# Patient Record
Sex: MALE | Race: OTHER | HISPANIC OR LATINO
[De-identification: names, ages, dates, MRNs, and addresses within clinical notes are randomized per-mention and may not be internally consistent; named-entity substitution may affect disease eponyms.]

---

## 2017-05-31 ENCOUNTER — TRANSCRIPTION ENCOUNTER (OUTPATIENT)
Age: 37
End: 2017-05-31

## 2017-06-29 ENCOUNTER — EMERGENCY (EMERGENCY)
Facility: HOSPITAL | Age: 37
LOS: 0 days | Discharge: HOME | End: 2017-06-29
Admitting: INTERNAL MEDICINE

## 2017-06-29 DIAGNOSIS — S61.411A LACERATION WITHOUT FOREIGN BODY OF RIGHT HAND, INITIAL ENCOUNTER: ICD-10-CM
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
## 2017-06-29 DIAGNOSIS — Z23 ENCOUNTER FOR IMMUNIZATION: ICD-10-CM
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
## 2017-07-10 ENCOUNTER — TRANSCRIPTION ENCOUNTER (OUTPATIENT)
Age: 37
End: 2017-07-10

## 2018-06-28 ENCOUNTER — TRANSCRIPTION ENCOUNTER (OUTPATIENT)
Age: 38
End: 2018-06-28

## 2019-05-14 ENCOUNTER — TRANSCRIPTION ENCOUNTER (OUTPATIENT)
Age: 39
End: 2019-05-14

## 2021-02-15 ENCOUNTER — TRANSCRIPTION ENCOUNTER (OUTPATIENT)
Age: 41
End: 2021-02-15

## 2021-12-05 ENCOUNTER — TRANSCRIPTION ENCOUNTER (OUTPATIENT)
Age: 41
End: 2021-12-05

## 2023-01-19 ENCOUNTER — NON-APPOINTMENT (OUTPATIENT)
Age: 43
End: 2023-01-19

## 2024-03-12 ENCOUNTER — APPOINTMENT (OUTPATIENT)
Dept: PAIN MANAGEMENT | Facility: CLINIC | Age: 44
End: 2024-03-12
Payer: COMMERCIAL

## 2024-03-12 PROBLEM — Z00.00 ENCOUNTER FOR PREVENTIVE HEALTH EXAMINATION: Status: ACTIVE | Noted: 2024-03-12

## 2024-03-12 PROCEDURE — 99204 OFFICE O/P NEW MOD 45 MIN: CPT

## 2024-03-12 RX ORDER — METHOCARBAMOL 750 MG/1
750 TABLET, FILM COATED ORAL
Qty: 30 | Refills: 0 | Status: ACTIVE | COMMUNITY
Start: 2024-03-12 | End: 1900-01-01

## 2024-03-14 NOTE — PHYSICAL EXAM
[de-identified] :  Lumbar Spine Exam: Inspection:  erythema (-)  ecchymosis (-)  rashes (-)  alignment: no scoliosis    Palpation:  Midline lumbar tenderness:             (-)  paraspinal tenderness:                  L (+) ; R (+)  sciatic nerve tenderness :             L (+) ; R (+)  SI joint tenderness:                        L (-) ; R (-)  GTB tenderness:                            L (-);  R (-)    Limited ROM 2/2 to pain with lumbar extension   Strength: 5/5 throughout all muscle groups of the lower extremity                                                                            Right       Left     Hip Flexion:                (5/5)       (5/5)  Quadriceps:               (5/5)       (5/5)  Hamstrings:                (5/5)       (5/5)  Ankle Dorsiflexion:     (5/5)       (5/5)  EHL:                           (5/5)       (5/5)  Ankle Plantarflexion:  (3+/5)       (5/5)    Special Tests:  SLR:                           R (+) ; L (-)  Facet loading:            R (-) ; L (-)  CUBA test:               R (-) ; L (-)    Neurologic: decrease sensation over the L5 dermatome on the right.  Light touch intact throughout  LE  Reflexes normal and symmetric  Gait: non antalgic gait ambulates w/o assistive device

## 2024-03-14 NOTE — DISCUSSION/SUMMARY
[de-identified] : A discussion regarding available pain management treatment options occurred with the patient. These included interventional, rehabilitative, pharmacological, and alternative modalities. We will proceed with the following:   Imagin. MRI lumbar spine w/o contrast to evaluate for anatomic changes of the lumbar discs, nerves, and surrounding tissue that will help provide information to accurately diagnose the patient's cause of pain and therefore treat said pain generator in the most effective way possible - whether that be specific physical therapy recommendations, medications, and/or interventional therapies.   Medications: 1. Ordered Meloxicam 15 mg to be taken daily for two weeks straight and then as needed.  2. Ordered Gabapentin 300 mg to be taken TID.  3. Ordered Methocarbamol 750 mg to be taken nightly.   I advised the patient that the NSAID should be taken with food.  In addition while taking the prescribed NSAID, no over the counter or other NSAIDs should be used, such as ibuprofen (Motrin or Advil) or naproxen (Aleve) as this can cause stomach upset or other side effects.  If needed for fever or breakthrough pain Tylenol can be used.  We are prescribing a muscle relaxant medication to help the patient's muscle spasm pain. The patient understands to not take this medication before driving or operating any heavy machinery as it can be sedating.  We are going to start gabapentin. Potential side effects were discussed which included dizziness, sedation, and pedal edema to name a few. If the patient cannot tolerate these side effects should they occur, then they will stop the medication. If the patient experiences sedation, they understand that they should not drive. The usage of the medication was discussed and the patient understands that it is an anti-epileptic medication used for neuropathic pain and that the pain relief from this medication will likely be subtle, and that hopefully in combination with the other treatments we are offering we will be able to get some additive relief.   - Follow up in 2 weeks to discuss imaging.   I Lynn Garcia attest that this documentation has been prepared under the direction and in the presence of provider Dr. Billy Merida.  The documentation recorded by the scribe in my presence, accurately reflects the service I personally performed, and the decisions made by me with my edits as appropriate.   Billy Merida, DO

## 2024-03-14 NOTE — DATA REVIEWED
[FreeTextEntry1] : X-ray of the lumbar spine taken on 3- showed severe L4-5 degenerative disc disease with finding which may represent discitis. There is moderate l5-S1 degenerative disc disease. Facet arthrosis at L4-5 and L5-S1. Trace retrolisthesis of L5 on S1 without instability. Probable short pedicles and spinal stenosis of lower lumbar spine.

## 2024-03-14 NOTE — HISTORY OF PRESENT ILLNESS
[FreeTextEntry1] : Mr. Barney is a 43-year-old male presenting to establish care for his lower back pain. He does have a history of microdiscectomy in the past which did relieve his radicular pain for 9 years. Over the last couple of months, he has been noticing a severe flare up of right sided lower back pain. He went to his chiropractor for about 4 sessions which did not improve his pain. He did go to the bathroom the other day when he felt a pop in the back Since then, he has had pain referring into the right leg. Pain is referring in a posterior fashion and into the anterolateral knee into the calf and foot. He has sharp, shooting pains which as associated with numbness and tingling. He has weakness in the right leg. Pain is made worse with standing or walking. He has limitation of his ADLs. He cannot sleep at night due to the pain. He has to constantly toss and turn. He denies any changes in bowel/bladder habits, fevers/chills or any recent weight loss.

## 2024-03-26 ENCOUNTER — APPOINTMENT (OUTPATIENT)
Dept: PAIN MANAGEMENT | Facility: CLINIC | Age: 44
End: 2024-03-26
Payer: COMMERCIAL

## 2024-03-26 PROCEDURE — 99214 OFFICE O/P EST MOD 30 MIN: CPT

## 2024-03-26 PROCEDURE — 64483 NJX AA&/STRD TFRM EPI L/S 1: CPT | Mod: RT

## 2024-03-26 PROCEDURE — 64484 NJX AA&/STRD TFRM EPI L/S EA: CPT | Mod: RT

## 2024-03-27 NOTE — PHYSICAL EXAM
[de-identified] : L spine   No rashes, erythema, edema noted TTP right lumbar paraspinals and sciatic notch Positive straight leg raise on the right LLE: 5/5 strength RLE: 5/5 strength except 4+/5 plantarflexion and ehl  Sensation reduced along R l5 and s1 dermatome

## 2024-03-27 NOTE — DISCUSSION/SUMMARY
[de-identified] : A discussion regarding available pain management treatment options occurred with the patient. These included interventional, rehabilitative, pharmacological, and alternative modalities. We will proceed with the following:   Interventional treatment options: 1. Proceed with a Right L5-S1, S1 transforaminal epidural steroid injection with sedation.  Treatment options were discussed. The patient has been having persistent, severe low back pain and lumbar radicular pain that has minimally improved with conservative therapy thus far (including physical therapy, home stretching and anti-inflammatory medications. The patient was given the option of proceeding with a lumbar epidural steroid injection to try and get the patient some pain relief.  The risks and benefits were discussed, which included the associated problems with steroids, bleeding, nerve injury, lack of improvement with pain, and 0.5% chance of a post dural puncture headache.   The patient has severe anxiety of procedures that necessitates monitored anesthesia care (MAC). The procedure performed will be close to major nerves, arteries, and spinal cord and/or joint structures. Due to the proximity of these structures, we need the patient to be still during the procedure.  With the help of MAC, this will be safely achieved and decrease the risk of any complications.   Medications: 1. Continue with Meloxicam 15 mg to be taken daily as needed.  * He was advised to stop this 5 days prior to injection.  2. Continue with Gabapentin 300 mg to be taken TID.  3. Continue with Methocarbamol 750 mg to be taken nightly.   I advised the patient that the NSAID should be taken with food.  In addition while taking the prescribed NSAID, no over the counter or other NSAIDs should be used, such as ibuprofen (Motrin or Advil) or naproxen (Aleve) as this can cause stomach upset or other side effects.  If needed for fever or breakthrough pain Tylenol can be used.  We are prescribing a muscle relaxant medication to help the patient's muscle spasm pain. The patient understands to not take this medication before driving or operating any heavy machinery as it can be sedating.  We are going to start gabapentin. Potential side effects were discussed which included dizziness, sedation, and pedal edema to name a few. If the patient cannot tolerate these side effects should they occur, then they will stop the medication. If the patient experiences sedation, they understand that they should not drive. The usage of the medication was discussed and the patient understands that it is an anti-epileptic medication used for neuropathic pain and that the pain relief from this medication will likely be subtle, and that hopefully in combination with the other treatments we are offering we will be able to get some additive relief.   - Follow up in 2 weeks post injection.   I Lynn Garcia attest that this documentation has been prepared under the direction and in the presence of provider Dr. Billy Merida.  The documentation recorded by the scribe in my presence, accurately reflects the service I personally performed, and the decisions made by me with my edits as appropriate.   Billy Merida, DO

## 2024-03-27 NOTE — HISTORY OF PRESENT ILLNESS
[FreeTextEntry1] : Mr. Barney is a 43-year-old male presenting to establish care for his lower back pain. He does have a history of microdiscectomy in the past which did relieve his radicular pain for 9 years. Over the last couple of months, he has been noticing a severe flare up of right sided lower back pain. He went to his chiropractor for about 4 sessions which did not improve his pain. He did go to the bathroom the other day when he felt a pop in the back Since then, he has had pain referring into the right leg. Pain is referring in a posterior fashion and into the anterolateral knee into the calf and foot. He has sharp, shooting pains which as associated with numbness and tingling. He has weakness in the right leg. Pain is made worse with standing or walking. He has limitation of his ADLs. He cannot sleep at night due to the pain. He has to constantly toss and turn. He denies any changes in bowel/bladder habits, fevers/chills or any recent weight loss.    TODAY: 3-: Revisit encounter.   He continues today with ongoing severe lower back pain. He has radicular feature into the right lower extremity. He feels a constant burning sensation in his right side of the lower back and into the leg. He has significant pain when standing or walking for prolonged periods of time. He has weakness in the right leg. He notes numbness and tingling which is present in the right leg when standing, sitting, or walking. He feels like he struggles with pushing with his right leg. He notes no pain when sitting. Pain has been present daily and constant. He denies any changes in bowel/bladder habits, fevers/chills or any recent weight loss.   He has been taking Gabapentin 300 mg QHS, Meloxicam 15 mg daily as needed and Methocarbamol 750 mg QHS. He affords about 30-40% improvement in pain and increase in function with the use of these medications.

## 2024-03-27 NOTE — DATA REVIEWED
[FreeTextEntry1] : X-ray of the lumbar spine taken on 3- showed severe L4-5 degenerative disc disease with finding which may represent discitis. There is moderate l5-S1 degenerative disc disease. Facet arthrosis at L4-5 and L5-S1. Trace retrolisthesis of L5 on S1 without instability. Probable short pedicles and spinal stenosis of lower lumbar spine.   MRI of the lumbar spine taken on 3- IMPRESSION:  1. Multilevel degenerative changes, most pronounced at L5-S1, where retrolisthesis, disc bulge and a large right paracentral/subarticular disc extrusion with rostral and caudal migration of disc material result in effacement of the right lateral recess, mass effect upon the descending right S1 nerve root, moderate narrowing of the spinal canal and mild bilateral foraminal stenosis. 2. Additionally, L3-L4 disc bulge and a right paracentral/subarticular disc protrusion result in mild spinal canal and bilateral neural foraminal stenosis with narrowing of the right lateral recess and encroachment upon the descending right L4 nerve root.

## 2024-04-04 ENCOUNTER — APPOINTMENT (OUTPATIENT)
Dept: PAIN MANAGEMENT | Facility: CLINIC | Age: 44
End: 2024-04-04
Payer: COMMERCIAL

## 2024-04-04 PROCEDURE — 64484 NJX AA&/STRD TFRM EPI L/S EA: CPT | Mod: RT

## 2024-04-04 PROCEDURE — 64483 NJX AA&/STRD TFRM EPI L/S 1: CPT | Mod: RT

## 2024-04-04 PROCEDURE — 00630 ANES PX LUMBAR REGION NOS: CPT | Mod: QZ,P2

## 2024-04-19 ENCOUNTER — RX RENEWAL (OUTPATIENT)
Age: 44
End: 2024-04-19

## 2024-04-19 ENCOUNTER — APPOINTMENT (OUTPATIENT)
Dept: PAIN MANAGEMENT | Facility: CLINIC | Age: 44
End: 2024-04-19
Payer: COMMERCIAL

## 2024-04-19 PROCEDURE — 99214 OFFICE O/P EST MOD 30 MIN: CPT

## 2024-04-19 RX ORDER — MELOXICAM 15 MG/1
15 TABLET ORAL
Qty: 30 | Refills: 0 | Status: DISCONTINUED | COMMUNITY
Start: 2024-03-12 | End: 2024-04-19

## 2024-04-19 RX ORDER — GABAPENTIN 400 MG/1
400 CAPSULE ORAL 3 TIMES DAILY
Qty: 90 | Refills: 1 | Status: ACTIVE | COMMUNITY
Start: 2024-03-12 | End: 1900-01-01

## 2024-04-22 NOTE — DISCUSSION/SUMMARY
[de-identified] : A discussion regarding available pain management treatment options occurred with the patient. These included interventional, rehabilitative, pharmacological, and alternative modalities. We will proceed with the following:   Interventional treatment options: 1. Proceed with a L5-S1 interlaminar epidural steroid injection with sedation.  Treatment options were discussed. The patient has been having persistent, severe low back pain and lumbar radicular pain that has minimally improved with conservative therapy thus far (including physical therapy, home stretching and anti-inflammatory medications. The patient was given the option of proceeding with a lumbar epidural steroid injection to try and get the patient some pain relief.  The risks and benefits were discussed, which included the associated problems with steroids, bleeding, nerve injury, lack of improvement with pain, and 0.5% chance of a post dural puncture headache.   The patient has severe anxiety of procedures that necessitates monitored anesthesia care (MAC). The procedure performed will be close to major nerves, arteries, and spinal cord and/or joint structures. Due to the proximity of these structures, we need the patient to be still during the procedure.  With the help of MAC, this will be safely achieved and decrease the risk of any complications.   Medications: 1. Ordered Gabapentin 400 mg q8h.   We are going to start gabapentin. Potential side effects were discussed which included dizziness, sedation, and pedal edema to name a few. If the patient cannot tolerate these side effects should they occur, then they will stop the medication. If the patient experiences sedation, they understand that they should not drive. The usage of the medication was discussed and the patient understands that it is an anti-epileptic medication used for neuropathic pain and that the pain relief from this medication will likely be subtle, and that hopefully in combination with the other treatments we are offering we will be able to get some additive relief.   - Follow up in 2 weeks post injection.   I Lynn Garcia attest that this documentation has been prepared under the direction and in the presence of provider Dr. Billy Merida.  The documentation recorded by the scribe in my presence, accurately reflects the service I personally performed, and the decisions made by me with my edits as appropriate.   Billy Merida, DO

## 2024-04-22 NOTE — HISTORY OF PRESENT ILLNESS
[FreeTextEntry1] : Mr. Barney is a 43-year-old male presenting to establish care for his lower back pain. He does have a history of microdiscectomy in the past which did relieve his radicular pain for 9 years. Over the last couple of months, he has been noticing a severe flare up of right sided lower back pain. He went to his chiropractor for about 4 sessions which did not improve his pain. He did go to the bathroom the other day when he felt a pop in the back Since then, he has had pain referring into the right leg. Pain is referring in a posterior fashion and into the anterolateral knee into the calf and foot. He has sharp, shooting pains which as associated with numbness and tingling. He has weakness in the right leg. Pain is made worse with standing or walking. He has limitation of his ADLs. He cannot sleep at night due to the pain. He has to constantly toss and turn. He denies any changes in bowel/bladder habits, fevers/chills or any recent weight loss.    TODAY: 3-: Revisit encounter.   He continues today with ongoing severe lower back pain. He has radicular feature into the right lower extremity. He feels a constant burning sensation in his right side of the lower back and into the leg. He has significant pain when standing or walking for prolonged periods of time. He has weakness in the right leg. He notes numbness and tingling which is present in the right leg when standing, sitting, or walking. He feels like he struggles with pushing with his right leg. He notes no pain when sitting. Pain has been present daily and constant. He denies any changes in bowel/bladder habits, fevers/chills or any recent weight loss.   He has been taking Gabapentin 300 mg QHS, Meloxicam 15 mg daily as needed and Methocarbamol 750 mg QHS. He affords about 30-40% improvement in pain and increase in function with the use of these medications.   TODAY, 4-: Revisit encounter.   Since last visit, he underwent a Right L5-S1 transforaminal epidural steroid injection on 4-4-2024. He affords only about 10% relief from this injection. He continues today with ongoing right sided lumbar radicular pain. Pain starts at the right sciatic notch and refers into the right lower extremity. He has numbness and tingling along with weakness. When he walks, he feels like he needs to really push his leg to move. Pain is also present with sitting. Pain is present daily and limits his ADLs. He has been managing his pain with Gabapentin 300 mg q8h.

## 2024-05-02 ENCOUNTER — APPOINTMENT (OUTPATIENT)
Dept: PAIN MANAGEMENT | Facility: CLINIC | Age: 44
End: 2024-05-02
Payer: COMMERCIAL

## 2024-05-02 PROCEDURE — 00630 ANES PX LUMBAR REGION NOS: CPT | Mod: QZ,P1

## 2024-05-02 PROCEDURE — 62323 NJX INTERLAMINAR LMBR/SAC: CPT

## 2024-05-02 NOTE — PROCEDURE
[FreeTextEntry3] : Date of Service: 05/02/2024   Account: [id number]   Patient: CATALINO CHARLTON   YOB: 1980   Age: 43 year   Surgeon:      Billy Merida DO   Assistant:    None   Pre-Operative Diagnosis:         Lumbar radiculopathy M54.16   Post Operative Diagnosis:       Lumbar radiculopathy M54.16   Procedure:             Lumbar interlaminar (L5-S1) epidural steroid injection under fluoroscopic guidance   Anesthesia:            none    This procedure was carried out using fluoroscopic guidance.  The risks and benefits of the procedure were discussed extensively with the patient.  The consent of the patient was obtained, and the following procedure was performed. The patient was placed in the prone position on the fluoroscopy table and the area was prepped and draped in a sterile fashion.  A timeout was performed with all essential staff present and the site and side were verified.   The patient was placed in the prone position with a pillow under the abdomen to minimize the lumbar lordosis.  The lumbar area was prepped and draped in a sterile fashion.  Under A/P view with slight cephalad-caudad angulation, the L5-S1 interspace was identified and marked.  Using sterile technique, the superficial skin was anesthetized with 1% Lidocaine.  A 20-gauge Tuohy needle was advanced into the epidural space under fluoroscopy using loss of resistance at the L5-S1 level.  After negative aspiration for heme or CSF, an epidurogram was obtained in the A/P and lateral fluoroscopic views using 2-3 cc of Omnipaque contrast confirming epidural placement of the needle.  After this, 5 cc of of a mixture of preservative free normal saline and 20mg decadron were injected into the epidural space.   The needle was subsequently removed.  Vital signs remained normal.  Pulse oximeter was used throughout the procedure and the patient's pulse and oxygen saturation remained within normal limits.  The patient tolerated the procedure well.  There were no complications.  The patient was instructed to apply ice over the injection sites for twenty minutes every two hours for the next 24 to 48 hours.   Disposition:        1. The patient was advised to F/U in 1-2 weeks to assess the response to the injection.      2. The patient was also instructed to contact me immediately if there were any concerns related to the procedure performed.

## 2024-05-17 ENCOUNTER — APPOINTMENT (OUTPATIENT)
Dept: PAIN MANAGEMENT | Facility: CLINIC | Age: 44
End: 2024-05-17
Payer: COMMERCIAL

## 2024-05-17 DIAGNOSIS — M54.16 RADICULOPATHY, LUMBAR REGION: ICD-10-CM

## 2024-05-17 PROCEDURE — 99212 OFFICE O/P EST SF 10 MIN: CPT

## 2024-05-17 NOTE — HISTORY OF PRESENT ILLNESS
[FreeTextEntry1] : Mr. Barney is a 43-year-old male presenting to establish care for his lower back pain. He does have a history of microdiscectomy in the past which did relieve his radicular pain for 9 years. Over the last couple of months, he has been noticing a severe flare up of right sided lower back pain. He went to his chiropractor for about 4 sessions which did not improve his pain. He did go to the bathroom the other day when he felt a pop in the back Since then, he has had pain referring into the right leg. Pain is referring in a posterior fashion and into the anterolateral knee into the calf and foot. He has sharp, shooting pains which as associated with numbness and tingling. He has weakness in the right leg. Pain is made worse with standing or walking. He has limitation of his ADLs. He cannot sleep at night due to the pain. He has to constantly toss and turn. He denies any changes in bowel/bladder habits, fevers/chills or any recent weight loss.    TODAY: 3-: Revisit encounter.   He continues today with ongoing severe lower back pain. He has radicular feature into the right lower extremity. He feels a constant burning sensation in his right side of the lower back and into the leg. He has significant pain when standing or walking for prolonged periods of time. He has weakness in the right leg. He notes numbness and tingling which is present in the right leg when standing, sitting, or walking. He feels like he struggles with pushing with his right leg. He notes no pain when sitting. Pain has been present daily and constant. He denies any changes in bowel/bladder habits, fevers/chills or any recent weight loss.   He has been taking Gabapentin 300 mg QHS, Meloxicam 15 mg daily as needed and Methocarbamol 750 mg QHS. He affords about 30-40% improvement in pain and increase in function with the use of these medications.   TODAY, 4-: Revisit encounter.   Since last visit, he underwent a Right L5-S1 transforaminal epidural steroid injection on 4-4-2024. He affords only about 10% relief from this injection. He continues today with ongoing right sided lumbar radicular pain. Pain starts at the right sciatic notch and refers into the right lower extremity. He has numbness and tingling along with weakness. When he walks, he feels like he needs to really push his leg to move. Pain is also present with sitting. Pain is present daily and limits his ADLs. He has been managing his pain with Gabapentin 300 mg q8h.  TODAY, 5-2-2024: Revisit encounter.   Since his last visit, he underwent a L5-S1 interlaminar epidural steroid injection on 5-2-2024. He does notice relief of the burning sensation in his leg however the weakness and numbness still persists and is present on a constant basis. He has since followed up with his surgeon at [facility] and surgical correction is being done. He is scheduled for 6-.

## 2024-05-17 NOTE — DISCUSSION/SUMMARY
[de-identified] : A discussion regarding available pain management treatment options occurred with the patient. These included interventional, rehabilitative, pharmacological, and alternative modalities. We will proceed with the following:   Interventional treatment options: - None indicated at this time. Patient is scheduled for surgical correction at [facility] on 6-.   - Follow up after surgical correction.   I Lynn Garcia attest that this documentation has been prepared under the direction and in the presence of provider Dr. Billy Merida.  The documentation recorded by the scribe in my presence, accurately reflects the service I personally performed, and the decisions made by me with my edits as appropriate.   Billy Merida, DO

## 2025-01-17 ENCOUNTER — NON-APPOINTMENT (OUTPATIENT)
Age: 45
End: 2025-01-17

## 2025-01-20 ENCOUNTER — NON-APPOINTMENT (OUTPATIENT)
Age: 45
End: 2025-01-20

## 2025-07-31 ENCOUNTER — OFFICE (OUTPATIENT)
Dept: URBAN - METROPOLITAN AREA CLINIC 112 | Facility: CLINIC | Age: 45
Setting detail: OPHTHALMOLOGY
End: 2025-07-31
Payer: COMMERCIAL

## 2025-07-31 DIAGNOSIS — H43.392: ICD-10-CM

## 2025-07-31 DIAGNOSIS — H00.12: ICD-10-CM

## 2025-07-31 DIAGNOSIS — H31.012: ICD-10-CM

## 2025-07-31 PROCEDURE — 92004 COMPRE OPH EXAM NEW PT 1/>: CPT | Performed by: PHYSICIAN ASSISTANT

## 2025-07-31 PROCEDURE — 92250 FUNDUS PHOTOGRAPHY W/I&R: CPT | Performed by: PHYSICIAN ASSISTANT

## 2025-07-31 ASSESSMENT — VISUAL ACUITY
OD_BCVA: 20/25
OS_BCVA: 20/25

## 2025-07-31 ASSESSMENT — TONOMETRY
OS_IOP_MMHG: 14
OD_IOP_MMHG: 17

## 2025-07-31 ASSESSMENT — CONFRONTATIONAL VISUAL FIELD TEST (CVF)
OS_FINDINGS: FULL
OD_FINDINGS: FULL

## 2025-08-05 ENCOUNTER — OFFICE (OUTPATIENT)
Dept: URBAN - METROPOLITAN AREA CLINIC 94 | Facility: CLINIC | Age: 45
Setting detail: OPHTHALMOLOGY
End: 2025-08-05
Payer: COMMERCIAL

## 2025-08-05 DIAGNOSIS — H43.392: ICD-10-CM

## 2025-08-05 DIAGNOSIS — H31.012: ICD-10-CM

## 2025-08-05 PROCEDURE — 92134 CPTRZ OPH DX IMG PST SGM RTA: CPT | Performed by: OPHTHALMOLOGY

## 2025-08-05 PROCEDURE — 99213 OFFICE O/P EST LOW 20 MIN: CPT | Performed by: OPHTHALMOLOGY

## 2025-08-05 ASSESSMENT — TONOMETRY
OD_IOP_MMHG: 14
OS_IOP_MMHG: 14

## 2025-08-05 ASSESSMENT — VISUAL ACUITY
OS_BCVA: 20/20
OD_BCVA: 20/20

## 2025-08-05 ASSESSMENT — REFRACTION_AUTOREFRACTION
OD_SPHERE: -0.25
OS_CYLINDER: -0.50
OS_AXIS: 089
OS_SPHERE: +0.25
OD_AXIS: 000
OD_CYLINDER: 0.00

## 2025-08-05 ASSESSMENT — KERATOMETRY
OD_K1POWER_DIOPTERS: 44.50
OD_K2POWER_DIOPTERS: 45.00
OS_K2POWER_DIOPTERS: 44.75
OD_AXISANGLE_DEGREES: 116
OS_K1POWER_DIOPTERS: 44.75
OS_AXISANGLE_DEGREES: 090

## 2025-08-05 ASSESSMENT — CONFRONTATIONAL VISUAL FIELD TEST (CVF)
OD_FINDINGS: FULL
OS_FINDINGS: FULL